# Patient Record
Sex: FEMALE | Race: BLACK OR AFRICAN AMERICAN
[De-identification: names, ages, dates, MRNs, and addresses within clinical notes are randomized per-mention and may not be internally consistent; named-entity substitution may affect disease eponyms.]

---

## 2018-06-12 ENCOUNTER — HOSPITAL ENCOUNTER (OUTPATIENT)
Dept: HOSPITAL 17 - ESDC | Age: 33
Discharge: HOME | End: 2018-06-12
Attending: OPHTHALMOLOGY
Payer: COMMERCIAL

## 2018-06-12 DIAGNOSIS — E10.3532: Primary | ICD-10-CM

## 2018-06-12 DIAGNOSIS — Z79.4: ICD-10-CM

## 2018-06-12 PROCEDURE — 00145 ANES PX EYE VITREORTNL SURG: CPT

## 2018-06-12 PROCEDURE — 67113 REPAIR RETINAL DETACH CPLX: CPT

## 2018-06-12 PROCEDURE — 82948 REAGENT STRIP/BLOOD GLUCOSE: CPT

## 2018-06-14 NOTE — MP
cc:

Lucas Salazar MD, Karl E MD

****

 

 

DATE OF OPERATION:

06/12/2018

 

POSTOPERATIVE DIAGNOSIS:

1.  Severe proliferative diabetic retinopathy,

2.  Severe tractional retinal detachment, left eye.

 

PROCEDURE:

Pars vitrectomy, severe tractional retinal detachment repair, 

endolaser, air fluid exchange, insertion of 15% C3F8 gas, endolaser, 

left eye.

 

COMPLICATIONS:

None.

 

ESTIMATED BLOOD LOSS:

Less than 1 mL

 

ANESTHESIA:

Dr. Wright and general.

 

INDICATIONS FOR PROCEDURE:

The patient presented with severe proliferative diabetic retinopathy 

and tractional retinal detachment on her left eye.  The patient 

elected for surgical correction understanding risks, benefits and 

alternatives.

 

PROCEDURE NOTE:

After informed consent was obtained, patient was brought to the 

operating room where general anesthesia was established.  The left eye

was prepped and draped in sterile fashion.  Betadine in the 

conjunctival fornix.  A 3 port pars plana vitrectomy was established 

with a self-retaining infusion cannula.  A curved vitreous was 

evacuated.  The large tractional complexes were circumscribed with 

vitrectomy.  The complexes were segmented and then carefully 

delaminated with curved scissors, grasping forceps and vitrectomy.  It

had renewed mobility and PFO was instilled to reattach the retina.  

Endolaser was applied in a PRP fashion.  Air fluid exchange was 

carried out.  The retina remained nicely attached.  15% C3F8 gas was 

instilled.  Trocars were removed and sclerotomies closed.  

Subconjunctival injection of Ancef and dexamethasone were given.  The 

eye was patched with Tobramycin ointment.  The patient was brought to 

recovery room in stable condition to continue followup at [facility] for her postoperative care.

 

 

__________________________________

MD YOGESH Nevarez/

D: 06/14/2018, 04:58 PM

T: 06/14/2018, 05:15 PM

Visit #: [id number]

Job #: [id number]